# Patient Record
Sex: FEMALE | Race: WHITE | URBAN - METROPOLITAN AREA
[De-identification: names, ages, dates, MRNs, and addresses within clinical notes are randomized per-mention and may not be internally consistent; named-entity substitution may affect disease eponyms.]

---
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
## 2022-08-29 NOTE — BRIEF OP NOTE
[facility] Brief Operative Note    Pre-operative diagnosis: Osteochondritis Dissecan lesion   Post-operative diagnosis * No post-op diagnosis entered *   Procedure: Procedure(s):  Left Knee Diagnostic Arthroscopy and Open Osteochondral Allograft Transplantation    (Choice Anesthesia)  - Wound Class: I-Clean   - Wound Class: I-Clean   Surgeon: Keven   Assistants(s): Arvin Gillespie, Shanae Ortega PGY+4   Estimated blood loss: 100    Specimens: None   Findings: Large lateral femoral condyle defect.    Partial weight bearing LLE with crutches. No brace needed.   PO pain meds  Ice and elevate knee, ROM AT.  No anticoagulation or post op Abx needed.   Dressing to be left in place x5 days, then ok to remove  Follow up as scheduled with Dr. Baca.    Shanae Ortega PGY-4  Orthopedic Surgery  [phone]             
negative

## 2023-11-16 NOTE — TELEPHONE ENCOUNTER
Patient mother called and stated that it was going to be to hard with them just getting back from there vacation and getting the H&P done the day before and that they have a lot of school meetings to that week. She stated that hopefully the next one should be good, they will wait for the next graft.   - - -

## (undated) DEVICE — SU VICRYL 3-0 PS-2 18" UND J497G

## (undated) DEVICE — GLOVE PROTEXIS W/NEU-THERA 7.5  2D73TE75

## (undated) DEVICE — SU VICRYL 0 CT-1 3X27" J430T

## (undated) DEVICE — SOL NACL 0.9% IRRIG 3000ML BAG 2B7477

## (undated) DEVICE — LINEN GOWN X4 5410

## (undated) DEVICE — SUCTION MANIFOLD DORNOCH ULTRA CART UL-CL500

## (undated) DEVICE — SU ETHILON 3-0 PS-1 18" 1663H

## (undated) DEVICE — LINEN ORTHO PACK 5446

## (undated) DEVICE — PREP CHLORAPREP 26ML TINTED ORANGE  260815

## (undated) DEVICE — SU MONOCRYL 3-0 PS-1 27" Y936H

## (undated) DEVICE — BLADE KNIFE SURG 10 371110

## (undated) DEVICE — SUCTION IRR SYSTEM W/O TIP INTERPULSE HANDPIECE 0210-100-000

## (undated) DEVICE — CAST PADDING 6" STERILE 9046S

## (undated) DEVICE — STRAP KNEE/BODY 31143004

## (undated) DEVICE — DRSG KERLIX FLUFFS X5

## (undated) DEVICE — GOWN XLG DISP 9545

## (undated) DEVICE — SOL NACL 0.9% IRRIG 1000ML BOTTLE 2F7124

## (undated) DEVICE — PEN MARKING SKIN W/PAPER RULER 31145785

## (undated) DEVICE — DECANTER TRANSFER DEVICE 2008S

## (undated) DEVICE — GLOVE PROTEXIS POWDER FREE 8.0 ORTHOPEDIC 2D73ET80

## (undated) DEVICE — PACK ACL SUPPLEMENT STD

## (undated) DEVICE — DRSG STERI STRIP 1/2X4" R1547

## (undated) DEVICE — ESU GROUND PAD ADULT W/CORD E7507

## (undated) DEVICE — SU VICRYL 2-0 CT-1 27" UND J259H

## (undated) DEVICE — Device

## (undated) DEVICE — TUBING ARTHRO CONMED/LINVATEC PUMP BLUE INFLOW 10K100

## (undated) DEVICE — SU VICRYL 0 CT-1 27" UND J260H

## (undated) DEVICE — SYR 10ML FINGER CONTROL W/O NDL 309695

## (undated) DEVICE — SU MONOCRYL 4-0 PS-2 18" UND Y496G

## (undated) DEVICE — SU VICRYL 1 CT-1 36" J347H

## (undated) DEVICE — BLADE KNIFE SURG 11 371111

## (undated) DEVICE — DRSG GAUZE 4X8" NON21842

## (undated) DEVICE — BASIN SET MAJOR

## (undated) DEVICE — BLADE SAW SAGITTAL STRK SHORT 35.5X9X0.64MM 2108-148-000

## (undated) DEVICE — SYR 30ML LL W/O NDL 302832

## (undated) DEVICE — SU VICRYL 0 CT 36" J358H

## (undated) DEVICE — BLADE SAW SAGITTAL STRK 25X75X0.89MM SYS 6 6125-089-075

## (undated) DEVICE — BONE CLEANING TIP INTERPULSE  0210-010-000

## (undated) DEVICE — NDL 22GA 1.5"

## (undated) DEVICE — TUBING SUCTION MEDI-VAC 1/4"X20' N620A

## (undated) DEVICE — LINEN TOWEL PACK X5 5464

## (undated) DEVICE — DRSG XEROFORM 1X8"

## (undated) DEVICE — SOL WATER IRRIG 1000ML BOTTLE 2F7114

## (undated) RX ORDER — OXYCODONE AND ACETAMINOPHEN 5; 325 MG/1; MG/1
TABLET ORAL
Status: DISPENSED
Start: 2018-04-24

## (undated) RX ORDER — GABAPENTIN 300 MG/1
CAPSULE ORAL
Status: DISPENSED
Start: 2018-04-24

## (undated) RX ORDER — DEXAMETHASONE SODIUM PHOSPHATE 4 MG/ML
INJECTION, SOLUTION INTRA-ARTICULAR; INTRALESIONAL; INTRAMUSCULAR; INTRAVENOUS; SOFT TISSUE
Status: DISPENSED
Start: 2018-04-24

## (undated) RX ORDER — HYDROMORPHONE HYDROCHLORIDE 1 MG/ML
INJECTION, SOLUTION INTRAMUSCULAR; INTRAVENOUS; SUBCUTANEOUS
Status: DISPENSED
Start: 2018-04-24

## (undated) RX ORDER — FENTANYL CITRATE 50 UG/ML
INJECTION, SOLUTION INTRAMUSCULAR; INTRAVENOUS
Status: DISPENSED
Start: 2018-04-24

## (undated) RX ORDER — LIDOCAINE HYDROCHLORIDE 20 MG/ML
INJECTION, SOLUTION EPIDURAL; INFILTRATION; INTRACAUDAL; PERINEURAL
Status: DISPENSED
Start: 2018-04-24

## (undated) RX ORDER — ACETAMINOPHEN 325 MG/1
TABLET ORAL
Status: DISPENSED
Start: 2018-04-24

## (undated) RX ORDER — ONDANSETRON 2 MG/ML
INJECTION INTRAMUSCULAR; INTRAVENOUS
Status: DISPENSED
Start: 2018-04-24

## (undated) RX ORDER — CELECOXIB 200 MG/1
CAPSULE ORAL
Status: DISPENSED
Start: 2018-04-24

## (undated) RX ORDER — PROPOFOL 10 MG/ML
INJECTION, EMULSION INTRAVENOUS
Status: DISPENSED
Start: 2018-04-24

## (undated) RX ORDER — CEFAZOLIN SODIUM 1 G/3ML
INJECTION, POWDER, FOR SOLUTION INTRAMUSCULAR; INTRAVENOUS
Status: DISPENSED
Start: 2018-04-24